# Patient Record
Sex: MALE
[De-identification: names, ages, dates, MRNs, and addresses within clinical notes are randomized per-mention and may not be internally consistent; named-entity substitution may affect disease eponyms.]

---

## 2019-04-01 ENCOUNTER — HOSPITAL ENCOUNTER (EMERGENCY)
Dept: HOSPITAL 10 - E/R | Age: 54
LOS: 1 days | Discharge: HOME | End: 2019-04-02
Payer: SELF-PAY

## 2019-04-01 VITALS — RESPIRATION RATE: 10 BRPM | HEART RATE: 52 BPM | DIASTOLIC BLOOD PRESSURE: 87 MMHG | SYSTOLIC BLOOD PRESSURE: 129 MMHG

## 2019-04-01 VITALS
WEIGHT: 176.37 LBS | HEIGHT: 70 IN | BODY MASS INDEX: 25.25 KG/M2 | BODY MASS INDEX: 25.25 KG/M2 | WEIGHT: 176.37 LBS | HEIGHT: 70 IN

## 2019-04-01 DIAGNOSIS — R40.2122: ICD-10-CM

## 2019-04-01 DIAGNOSIS — F15.10: ICD-10-CM

## 2019-04-01 DIAGNOSIS — R40.2232: ICD-10-CM

## 2019-04-01 DIAGNOSIS — R40.2342: ICD-10-CM

## 2019-04-01 DIAGNOSIS — E86.0: ICD-10-CM

## 2019-04-01 DIAGNOSIS — G93.40: Primary | ICD-10-CM

## 2019-04-01 PROCEDURE — 70450 CT HEAD/BRAIN W/O DYE: CPT

## 2019-04-01 PROCEDURE — 96374 THER/PROPH/DIAG INJ IV PUSH: CPT

## 2019-04-01 PROCEDURE — 84484 ASSAY OF TROPONIN QUANT: CPT

## 2019-04-01 PROCEDURE — 99285 EMERGENCY DEPT VISIT HI MDM: CPT

## 2019-04-01 PROCEDURE — 80053 COMPREHEN METABOLIC PANEL: CPT

## 2019-04-01 PROCEDURE — 85025 COMPLETE CBC W/AUTO DIFF WBC: CPT

## 2019-04-01 PROCEDURE — 83690 ASSAY OF LIPASE: CPT

## 2019-04-01 PROCEDURE — 82962 GLUCOSE BLOOD TEST: CPT

## 2019-04-01 PROCEDURE — 36415 COLL VENOUS BLD VENIPUNCTURE: CPT

## 2019-04-01 PROCEDURE — 93005 ELECTROCARDIOGRAM TRACING: CPT

## 2019-04-01 PROCEDURE — 96375 TX/PRO/DX INJ NEW DRUG ADDON: CPT

## 2019-04-01 NOTE — ERD
ER Documentation


Chief Complaint


Chief Complaint








HPI


This is a 53-year-old man brought in by EMS for public intoxication, he admitted


to using crystal meth recently and was sleeping on the sidewalk.  He was 


lethargic and asleep on the sidewalk and transported here without other 


complications.  He has had no trauma, no vomiting or diarrhea, no complaints of 


chest pain or shortness of breath





ROS


All systems reviewed and are negative except as per history of present illness.





PMhx/Soc


Drug abuse





FmHx


Family History:  No diabetes





Physical Exam


Vitals





Vital Signs


  Date      Temp  Pulse  Resp  B/P (MAP)   Pulse Ox  O2          O2 Flow    FiO2


Time                                                 Delivery    Rate


    4/1/19           75    10      124/78        98  Room Air


     18:35                           (93)


    4/1/19           72    12      117/94       100


     16:24                          (102)


    4/1/19  97.7     76    14      117/54       100  Room Air


     16:23                           (75)


Per nurse's records


Physical Exam


GENERAL: Well-developed, dehydrated, intoxicated nonverbal man


HEENT: Dry mucous membranes, pink conjunctiva, no cervical spine tenderness or 


step-off deformities, 


NEURO: Nonverbal, moving all extremities, pinpoint pupils, able to answer simple


questions and follow simple commands


CARDIAC: Tachycardic and regular, no murmurs rubs or gallops


LUNGS: Clear bilaterally no wheezing crackles or stridor


EXTREMITIES: No clubbing cyanosis or edema, calves are bilaterally symmetrical, 


no Homans sign, no popliteal cord sign. Distal pulses equal and bilateral


PSYCH: Normal affect without agitation or irritability


Result Diagram:  


4/1/19 1626                                                                     


          4/1/19 1626





Results 24 hrs





Laboratory Tests


       Test
                                4/1/19
16:20    4/1/19
16:26


       Bedside Glucose                        108 mg/dL


       White Blood Count                                    4.7 10^3/ul


       Red Blood Count                                     4.36 10^6/ul


       Hemoglobin                                             12.1 g/dl


       Hematocrit                                                37.8 %


       Mean Corpuscular Volume                                  86.7 fl


       Mean Corpuscular Hemoglobin                              27.8 pg


       Mean Corpuscular Hemoglobin
Concent  
                32.0 g/dl 



       Red Cell Distribution Width                               13.2 %


       Platelet Count                                       235 10^3/UL


       Mean Platelet Volume                                      9.7 fl


       Immature Granulocytes %                                  0.200 %


       Neutrophils %                                             62.0 %


       Lymphocytes %                                             20.3 %


       Monocytes %                                               12.0 %


       Eosinophils %                                              4.9 %


       Basophils %                                                0.6 %


       Nucleated Red Blood Cells %                          0.0 /100WBC


       Immature Granulocytes #                            0.010 10^3/ul


       Neutrophils #                                        2.9 10^3/ul


       Lymphocytes #                                        1.0 10^3/ul


       Monocytes #                                          0.6 10^3/ul


       Eosinophils #                                        0.2 10^3/ul


       Basophils #                                          0.0 10^3/ul


       Nucleated Red Blood Cells #                          0.0 10^3/ul


       Sodium Level                                          142 mmol/L


       Potassium Level                                       3.5 mmol/L


       Chloride Level                                        107 mmol/L


       Carbon Dioxide Level                                   26 mmol/L


       Anion Gap                                                      9


       Blood Urea Nitrogen                                     15 mg/dl


       Creatinine                                            0.73 mg/dl


       Est Glomerular Filtrat Rate
mL/min   
             > 60 mL/min 



       Glucose Level                                           99 mg/dl


       Calcium Level                                          9.0 mg/dl


       Total Bilirubin                                        0.6 mg/dl


       Direct Bilirubin                                      0.00 mg/dl


       Indirect Bilirubin                                     0.6 mg/dl


       Aspartate Amino Transf
(AST/SGOT)    
                  28 IU/L 



       Alanine Aminotransferase
(ALT/SGPT)  
                  34 IU/L 



       Alkaline Phosphatase                                     87 IU/L


       Troponin I                                         < 0.012 ng/ml


       Total Protein                                           7.6 g/dl


       Albumin                                                 4.0 g/dl


       Globulin                                               3.60 g/dl


       Albumin/Globulin Ratio                                      1.11


       Lipase                                                    68 U/L





Current Medications


 Medications
   Dose
          Sig/Marielle
       Start Time
   Status  Last


 (Trade)       Ordered        Route
 PRN     Stop Time              Admin
Dose


                              Reason                                Admin


 Sodium         1,000 ml @ 
   Q30M STAT
     4/1/19        DC            4/1/19


Chloride       2,000 mls/hr   IV
            16:11
 4/1/19                16:20



                                             16:40


 Ondansetron    4 mg           ONCE  STAT
    4/1/19        DC            4/1/19


HCl
  (Zofran                 IV
            16:11
 4/1/19                16:39



Inj)                                         16:13


 Naloxone       2 mg           ONCE  ONCE
    4/1/19        DC            4/1/19


HCl
                          IV
            16:30
 4/1/19                16:20



(Narcan)                                     16:31








Procedures/MDM


IV line was established patient was placed on cardiac monitor rhythm strip 


revealed a sinus rhythm at about 70 bpm with upright P and T waves.  Patient was


afebrile





I administered 2 L normal saline IV for dehydration and naloxone 2 mg IV with 


minimal change in mental status.  He also received Zofran 4 mg IV x1





CT scan of the brain was negative for acute bleed mass or shift





CBC and electrolytes were normal, liver function tests normal, troponin negative





EKG performed, read by me revealed a normal sinus rhythm at 61 bpm, normal axis,


narrow QRS complex, no concerning ST elevations or depressions noted





Observation Note:


   Time:      6 hours


   Family Hx:   No Hypertension


   Evaluation:   Multiple exams showed improving symptoms and no evidence of 


worsening mental status.  Patient's mental status improved and neurologic exam 


is at baseline at this time, vital signs are normal and patient will be 


discharged





Differential diagnoses considered, included but not limited to acute coronary 


syndrome, pulmonary embolism, aortic dissection, abdominal aortic aneurysm, 


sepsis, stroke, meningitis, encephalitis, pneumonia, appendicitis, 


cholecystitis, bowel obstruction, pyelonephritis, nephrolithiasis, cystitis, as 


well as metabolic, hematologic, and electrolyte abnormalities. As well as 


abscess, cellulitis, fractures, and dislocations.





Patient feels much better at this time, and vital signs are normal, symptoms 


have improved. I did give strict instructions to return to the ED if symptoms 


continue or worsen, patient will otherwise follow-up with primary care 


physician. Patient understood instructions and agreed to plan.





Disclaimer: Inadvertent spelling and grammatical errors are likely due to 


EHR/dictation software use and do not reflect on the overall quality of patient 


care. Also, please note that the electronic time recorded on this note does not 


necessarily reflect the actual time of the patient encounter.





Departure


Diagnosis:  


   Primary Impression:  


   Acute encephalopathy


   Additional Impressions:  


   Drug abuse


   Acute dehydration


Condition:  Stable











ALCIRA DUNCAN MD              Apr 1, 2019 16:23